# Patient Record
Sex: FEMALE | Race: WHITE | NOT HISPANIC OR LATINO | Employment: FULL TIME | URBAN - METROPOLITAN AREA
[De-identification: names, ages, dates, MRNs, and addresses within clinical notes are randomized per-mention and may not be internally consistent; named-entity substitution may affect disease eponyms.]

---

## 2024-07-30 ENCOUNTER — APPOINTMENT (OUTPATIENT)
Dept: OPHTHALMOLOGY | Facility: CLINIC | Age: 49
End: 2024-07-30
Payer: COMMERCIAL

## 2024-07-30 DIAGNOSIS — H47.323 DRUSEN OF BOTH OPTIC DISCS: ICD-10-CM

## 2024-07-30 DIAGNOSIS — Q07.8 ANOMALOUS OPTIC NERVE (MULTI): ICD-10-CM

## 2024-07-30 DIAGNOSIS — H52.4 MYOPIA OF BOTH EYES WITH ASTIGMATISM AND PRESBYOPIA: Primary | ICD-10-CM

## 2024-07-30 DIAGNOSIS — H52.203 MYOPIA OF BOTH EYES WITH ASTIGMATISM AND PRESBYOPIA: Primary | ICD-10-CM

## 2024-07-30 DIAGNOSIS — H52.13 MYOPIA OF BOTH EYES WITH ASTIGMATISM AND PRESBYOPIA: Primary | ICD-10-CM

## 2024-07-30 PROCEDURE — 92014 COMPRE OPH EXAM EST PT 1/>: CPT | Performed by: OPTOMETRIST

## 2024-07-30 PROCEDURE — 92310 CONTACT LENS FITTING OU: CPT | Performed by: OPTOMETRIST

## 2024-07-30 PROCEDURE — 92133 CPTRZD OPH DX IMG PST SGM ON: CPT | Performed by: OPTOMETRIST

## 2024-07-30 PROCEDURE — 92015 DETERMINE REFRACTIVE STATE: CPT | Performed by: OPTOMETRIST

## 2024-07-30 PROCEDURE — 92083 EXTENDED VISUAL FIELD XM: CPT | Performed by: OPTOMETRIST

## 2024-07-30 RX ORDER — ALBUTEROL SULFATE 90 UG/1
2 AEROSOL, METERED RESPIRATORY (INHALATION) EVERY 4 HOURS PRN
COMMUNITY
Start: 2023-03-07

## 2024-07-30 RX ORDER — ERGOCALCIFEROL 1.25 MG/1
50000 CAPSULE ORAL
COMMUNITY
Start: 2024-02-23

## 2024-07-30 RX ORDER — OMEPRAZOLE 40 MG/1
40 CAPSULE, DELAYED RELEASE ORAL DAILY
COMMUNITY

## 2024-07-30 RX ORDER — EPINEPHRINE 0.3 MG/.3ML
0.3 INJECTION INTRAMUSCULAR
COMMUNITY
Start: 2016-12-21

## 2024-07-30 RX ORDER — FLUTICASONE PROPIONATE 50 MCG
SPRAY, SUSPENSION (ML) NASAL
COMMUNITY
Start: 2016-12-21

## 2024-07-30 ASSESSMENT — REFRACTION_CURRENTRX
OS_DIAMETER: 14.5
OD_DIAMETER: 14.5
OD_SPHERE: -4.00
OD_CYLINDER: -1.75
OD_BASECURVE: 8.6
OS_SPHERE: -5.50
OS_CYLINDER: -1.25
OD_SPHERE: -4.00
OS_SPHERE: -5.50
OS_BRAND: ACUVUE OASYS ASTIGMATISM
OD_CYLINDER: -1.75
OS_BASECURVE: 8.6
OD_DIAMETER: 14.5
OD_AXIS: 020
OS_CYLINDER: -1.25
OD_AXIS: 020
OS_AXIS: 170
OS_BASECURVE: 8.6
OS_AXIS: 170
OD_BASECURVE: 8.6
OD_BRAND: ACUVUE OASYS ASTIGMATISM
OS_DIAMETER: 14.5

## 2024-07-30 ASSESSMENT — VISUAL ACUITY
OD_CC: 20/30
VA_OR_OS_CURRENT_RX: 20/20-2
VA_OR_OD_CURRENT_RX: 20/20-2
VA_OR_OS_CURRENT_RX: 20/20-2
OS_CC+: -2
VA_OR_OD_CURRENT_RX: 20/20-2
OD_PH_CC: 20/25
CORRECTION_TYPE: CONTACTS
OS_CC: 20/20
METHOD: SNELLEN - LINEAR

## 2024-07-30 ASSESSMENT — TONOMETRY
OS_IOP_MMHG: 17
OD_IOP_MMHG: 16
IOP_METHOD: GOLDMANN APPLANATION

## 2024-07-30 ASSESSMENT — ENCOUNTER SYMPTOMS
CONSTITUTIONAL NEGATIVE: 0
ALLERGIC/IMMUNOLOGIC NEGATIVE: 0
CARDIOVASCULAR NEGATIVE: 0
PSYCHIATRIC NEGATIVE: 0
ENDOCRINE NEGATIVE: 0
NEUROLOGICAL NEGATIVE: 0
EYES NEGATIVE: 0
RESPIRATORY NEGATIVE: 0
MUSCULOSKELETAL NEGATIVE: 0
GASTROINTESTINAL NEGATIVE: 0
HEMATOLOGIC/LYMPHATIC NEGATIVE: 0

## 2024-07-30 ASSESSMENT — CONF VISUAL FIELD
OS_INFERIOR_TEMPORAL_RESTRICTION: 0
OD_NORMAL: 1
METHOD: COUNTING FINGERS
OD_SUPERIOR_TEMPORAL_RESTRICTION: 0
OS_NORMAL: 1
OD_INFERIOR_NASAL_RESTRICTION: 0
OD_INFERIOR_TEMPORAL_RESTRICTION: 0
OS_SUPERIOR_TEMPORAL_RESTRICTION: 0
OS_INFERIOR_NASAL_RESTRICTION: 0
OD_SUPERIOR_NASAL_RESTRICTION: 0
OS_SUPERIOR_NASAL_RESTRICTION: 0

## 2024-07-30 ASSESSMENT — REFRACTION_MANIFEST
OS_SPHERE: -5.75
OS_AXIS: 170
OD_SPHERE: -5.00
OS_ADD: +1.50
OS_CYLINDER: -1.50
OD_CYLINDER: -2.25
OD_ADD: +1.50
OD_AXIS: 015

## 2024-07-30 ASSESSMENT — REFRACTION_WEARINGRX
OD_SPHERE: -4.25
OS_AXIS: 165
OS_SPHERE: -5.00
OD_AXIS: 015
OS_CYLINDER: -1.50
OD_CYLINDER: -2.00

## 2024-07-30 ASSESSMENT — SLIT LAMP EXAM - LIDS
COMMENTS: GOOD POSITION
COMMENTS: GOOD POSITION

## 2024-07-30 ASSESSMENT — CUP TO DISC RATIO
OD_RATIO: .3
OS_RATIO: .3

## 2024-07-30 ASSESSMENT — EXTERNAL EXAM - RIGHT EYE: OD_EXAM: NORMAL

## 2024-07-30 ASSESSMENT — EXTERNAL EXAM - LEFT EYE: OS_EXAM: NORMAL

## 2024-07-30 NOTE — PROGRESS NOTES
Downward tilt optic nerve, ? optic nerve drusen OD as well.   Optical coherence tomography of the retinal nerve fiber layer (RNFL) revealed:    OD: Normal thickness in all four sectors with an average RNFL thickness of 107 was 103 micron.   OS: Normal thickness in all four sectors with an average RNFL thickness of 104 was 97 micron.    A Khan 24-2 threshold visual field test was done.  Results were:  OD: the absence of scotoma, pattern standard deviation 2.08 dB, mean deviation -1.58 dB  OS: the absence of scotoma, pattern standard deviation 2.46 dB, mean deviation -2.51 dB      A spectacle prescription was dispensed to be used as needed.     A contact lens prescription was dispensed at the patient`s request. OD slightly under corrected and patient does feel the right eye is blurred slightly for distance but likes that she doesn't require the reading glasses a lot. Will keep the power the same.      RTc 1 year MR LAZARUS key DFE OCT RNFL and 24-2

## 2024-10-24 ENCOUNTER — DOCUMENTATION (OUTPATIENT)
Dept: OPHTHALMOLOGY | Facility: CLINIC | Age: 49
End: 2024-10-24
Payer: COMMERCIAL

## 2024-10-24 ASSESSMENT — REFRACTION_CURRENTRX
OD_CYLINDER: -1.75
OS_SPHERE: -5.50
OD_AXIS: 020
OS_BASECURVE: 8.6
OS_AXIS: 170
OD_SPHERE: -4.00
OS_DIAMETER: 14.5
OD_BASECURVE: 8.6
OS_CYLINDER: -1.25
OD_DIAMETER: 14.5

## 2025-08-05 ENCOUNTER — APPOINTMENT (OUTPATIENT)
Dept: OPHTHALMOLOGY | Facility: CLINIC | Age: 50
End: 2025-08-05
Payer: COMMERCIAL

## 2025-12-30 ENCOUNTER — APPOINTMENT (OUTPATIENT)
Dept: OPHTHALMOLOGY | Facility: CLINIC | Age: 50
End: 2025-12-30
Payer: COMMERCIAL